# Patient Record
Sex: MALE | Race: WHITE | NOT HISPANIC OR LATINO | Employment: FULL TIME | URBAN - METROPOLITAN AREA
[De-identification: names, ages, dates, MRNs, and addresses within clinical notes are randomized per-mention and may not be internally consistent; named-entity substitution may affect disease eponyms.]

---

## 2023-11-21 ENCOUNTER — TELEMEDICINE (OUTPATIENT)
Dept: PRIMARY CARE | Facility: CLINIC | Age: 63
End: 2023-11-21

## 2023-11-21 DIAGNOSIS — U07.1 COVID-19: Primary | ICD-10-CM

## 2023-11-21 PROCEDURE — 99213 OFFICE O/P EST LOW 20 MIN: CPT | Performed by: FAMILY MEDICINE

## 2023-11-21 ASSESSMENT — ENCOUNTER SYMPTOMS
FEVER: 1
RHINORRHEA: 1
COUGH: 1
HEADACHES: 1
SINUS PRESSURE: 1
SINUS PAIN: 1
CHILLS: 1
SORE THROAT: 1
FATIGUE: 1
SHORTNESS OF BREATH: 0

## 2023-11-21 NOTE — PROGRESS NOTES
Subjective   Patient ID: Jesse Nelson is a 62 y.o. male who presents for No chief complaint on file..    Virtual or Telephone Consent    An interactive audio and video telecommunication system which permits real time communications between the patient (at the originating site) and provider (at the distant site) was utilized to provide this telehealth service.   Verbal consent was requested and obtained from Jesse Nelson on this date, 11/21/23 for a telehealth visit.     Pt tested pos for COVID last night  He has a headache, body aches, runny nose,  Fever, cough  No daily meds for chronic disease  He has been taking Tylenol / Dayquil  Last vaccine was one year ago         Review of Systems   Constitutional:  Positive for chills, fatigue and fever.   HENT:  Positive for congestion, rhinorrhea, sinus pressure, sinus pain, sneezing and sore throat.    Respiratory:  Positive for cough. Negative for shortness of breath.    Neurological:  Positive for headaches.       Objective   There were no vitals taken for this visit.    Physical Exam  Constitutional:       Appearance: Normal appearance.   Pulmonary:      Effort: Pulmonary effort is normal.   Neurological:      Mental Status: He is alert.       Virtual Visit Duration: 10 min  Photo ID: verified  Med Allergies: DA  Pharmacy verified  No known chronic kidney disease per pt, no recent labs    Assessment/Plan   Diagnoses and all orders for this visit:  COVID-19  -     nirmatrelvir-ritonavir (PAXLOVID) 300 mg (150 mg x 2)-100 mg tablet therapy pack; Take 3 tablets by mouth 2 times a day for 5 days. Follow the instructions on the package    I discussed the R/B/SE of Paxlovid  I recc continued supportive care, fluids, rest, Mucinex  I advised the patient to have an in person exam with PCP, urgent care, or Emergency Room with any exacerbation of symptoms. The patient understands and agrees.   Melissa Peña, DO